# Patient Record
Sex: FEMALE | URBAN - NONMETROPOLITAN AREA
[De-identification: names, ages, dates, MRNs, and addresses within clinical notes are randomized per-mention and may not be internally consistent; named-entity substitution may affect disease eponyms.]

---

## 2022-08-19 ENCOUNTER — TELEPHONE (OUTPATIENT)
Dept: ONCOLOGY | Facility: CLINIC | Age: 37
End: 2022-08-19

## 2022-08-19 NOTE — TELEPHONE ENCOUNTER
Received call from patient Barbi Gomez, she reports that last nite she had some kind of episodes twice, she says that she felt like her heart was skipping a beat, she thought it was  A-fib?    I asked the patient,  if she was having a bowel movement when this happed? She says yes that she was both times when this occurred   Patient informed that sometimes when people are straining when having a bowel movement or even vomiting it can cause your heart to miss beats, this is caused from her Vegal nerve, most always the heart will resume a normal pattern, patient v/u and says she feels ok today, but says she was told to report any abnormalities or if she had concerns,       NOTE:   Patient had 2-D Echo performed yesterday 8/18/22